# Patient Record
Sex: FEMALE | Race: WHITE | Employment: OTHER | URBAN - METROPOLITAN AREA
[De-identification: names, ages, dates, MRNs, and addresses within clinical notes are randomized per-mention and may not be internally consistent; named-entity substitution may affect disease eponyms.]

---

## 2017-03-02 ENCOUNTER — HOSPITAL ENCOUNTER (EMERGENCY)
Facility: HOSPITAL | Age: 42
Discharge: STILL ADMITTED OR ADMINISTRATIVE | End: 2017-03-03
Payer: COMMERCIAL

## 2017-03-02 ENCOUNTER — HOSPITAL ENCOUNTER (OUTPATIENT)
Age: 42
Discharge: LEFT BEFORE DISCHARGE | End: 2017-03-02
Attending: FAMILY MEDICINE
Payer: COMMERCIAL

## 2017-03-02 VITALS
TEMPERATURE: 99 F | DIASTOLIC BLOOD PRESSURE: 80 MMHG | BODY MASS INDEX: 26.81 KG/M2 | OXYGEN SATURATION: 96 % | HEART RATE: 77 BPM | SYSTOLIC BLOOD PRESSURE: 135 MMHG | WEIGHT: 181 LBS | RESPIRATION RATE: 15 BRPM | HEIGHT: 69 IN

## 2017-03-02 DIAGNOSIS — R07.9 ACUTE CHEST PAIN: Primary | ICD-10-CM

## 2017-03-02 LAB
ATRIAL RATE: 77 BPM
P AXIS: 39 DEGREES
P-R INTERVAL: 164 MS
Q-T INTERVAL: 392 MS
QRS DURATION: 96 MS
QTC CALCULATION (BEZET): 443 MS
R AXIS: 15 DEGREES
T AXIS: 36 DEGREES
VENTRICULAR RATE: 77 BPM

## 2017-03-02 PROCEDURE — 99205 OFFICE O/P NEW HI 60 MIN: CPT

## 2017-03-02 PROCEDURE — 93010 ELECTROCARDIOGRAM REPORT: CPT | Performed by: INTERNAL MEDICINE

## 2017-03-02 PROCEDURE — 93005 ELECTROCARDIOGRAM TRACING: CPT

## 2017-03-02 PROCEDURE — 93010 ELECTROCARDIOGRAM REPORT: CPT

## 2017-03-02 RX ORDER — ASPIRIN 81 MG/1
324 TABLET, CHEWABLE ORAL ONCE
Status: DISCONTINUED | OUTPATIENT
Start: 2017-03-02 | End: 2017-03-02

## 2017-03-02 NOTE — ED NOTES
Dr Yolanda Pride and myself discussed in length the importance of going to ED for further evaluation of abnormal EKG, patient acknowledged she understands risk of not going to ED via EMS and signed AMA.

## 2017-03-02 NOTE — ED PROVIDER NOTES
Patient Seen in: [facility]    History   Patient presents with:  Chest Pain Angina (cardiovascular)    Stated Complaint: chest pressure sob x 24 hours    HPI    Patient is a 19-year-old female.   Coming in today with complai Positive for chest pain. Negative for palpitations and leg swelling. Gastrointestinal: Negative. Genitourinary: Negative. Musculoskeletal: Negative. Skin: Negative. Neurological: Positive for dizziness.  Negative for tremors, seizures, syncope no wheezes. She has no rales. She exhibits no tenderness. Abdominal: Soft. Bowel sounds are normal. She exhibits no distension and no mass. There is no tenderness. There is no rebound and no guarding. Musculoskeletal: Normal range of motion.  She exhibi patient.

## 2017-03-02 NOTE — ED INITIAL ASSESSMENT (HPI)
Pt c/o chest pain and mild shortness of breath. Pt states yesterday that she took her children to the pool yesterday and noticed chest pain that started under her left breast and she noticed that she felt dizzy.   Then they traveled back from [facility] l

## 2017-03-02 NOTE — ED NOTES
Called patient to check if she was on her way to emergency dept. .  Patient stated she was on her way to see her fathers cardiologist.

## 2019-03-26 ENCOUNTER — TELEPHONE (OUTPATIENT)
Dept: PEDIATRICS CLINIC | Facility: CLINIC | Age: 44
End: 2019-03-26

## 2019-03-27 ENCOUNTER — TELEPHONE (OUTPATIENT)
Dept: OBGYN CLINIC | Facility: CLINIC | Age: 44
End: 2019-03-27

## 2019-03-29 ENCOUNTER — TELEPHONE (OUTPATIENT)
Dept: OBGYN CLINIC | Facility: CLINIC | Age: 44
End: 2019-03-29

## 2019-03-29 NOTE — TELEPHONE ENCOUNTER
Carltccarlos.     PN records received & reviewed by cnm's.  Per [facility], practice is unable to accept pt as a transfer of care this late in her pregnancy (NATALYA 4/6/19) with insufficient PN care

## 2019-03-29 NOTE — TELEPHONE ENCOUNTER
PER PT CALLING TO CONFIRMED THAT HER MEDICAL RECORDS HAS BEEN RECEIVED / PT STATE SHE ALSO FAXED OVER A INTRODUCTION LETTER / PLS ADV

## 2022-06-27 ENCOUNTER — OFFICE VISIT (OUTPATIENT)
Dept: CARDIOLOGY | Age: 47
End: 2022-06-27

## 2022-06-27 VITALS — BODY MASS INDEX: 29.49 KG/M2 | HEIGHT: 70 IN | WEIGHT: 206 LBS

## 2022-06-27 DIAGNOSIS — R07.2 PRECORDIAL CHEST PAIN: Primary | ICD-10-CM

## 2022-06-27 DIAGNOSIS — E78.00 PURE HYPERCHOLESTEROLEMIA: ICD-10-CM

## 2022-06-27 DIAGNOSIS — R94.31 ABNORMAL EKG: ICD-10-CM

## 2022-06-27 DIAGNOSIS — Z82.49 FAMILY HISTORY OF ISCHEMIC HEART DISEASE: ICD-10-CM

## 2022-06-27 PROCEDURE — 99204 OFFICE O/P NEW MOD 45 MIN: CPT | Performed by: INTERNAL MEDICINE

## 2022-06-27 PROCEDURE — 93000 ELECTROCARDIOGRAM COMPLETE: CPT | Performed by: INTERNAL MEDICINE

## 2022-06-27 SDOH — HEALTH STABILITY: PHYSICAL HEALTH: ON AVERAGE, HOW MANY MINUTES DO YOU ENGAGE IN EXERCISE AT THIS LEVEL?: 60 MIN

## 2022-06-27 SDOH — HEALTH STABILITY: PHYSICAL HEALTH: ON AVERAGE, HOW MANY DAYS PER WEEK DO YOU ENGAGE IN MODERATE TO STRENUOUS EXERCISE (LIKE A BRISK WALK)?: 6 DAYS

## (undated) NOTE — LETTER
Date & Time: 3/2/2017, 2:08 PM  Patient: Romel Seymour Rojelio  Attending Provider: Shikha Flores MD      This certifies that I, Saranya Garcia, a patient at an [facility], am leaving the facility v

## (undated) NOTE — ED AVS SNAPSHOT
[facility] SHANIKA Tracy    [address] [place] [address]    Phone:  [phone]    Fax:  [phone]           Zulema Santana   MRN: [id number]    Department:  THE [facility] Click [email or web address]      Or call [phone]    If you have any problems with your follow-up, please call our  at [phone].     Si usted tiene algun problema con andre sequimiento, por favor llame a nuestro adminstrador de casos al (6 Pharmacy Address Phone Number   [address] [facility] ([address] [facility]) [address] [facility] [address] [facility] [address] ([address]) [address] [facility] [address] [facility] [address]  ([address] [facility] [address] [address]) [phone] Now link in the [employer]U [employer]. Enter your [employer] Activation Code exactly as it appears below along with your Zip Code and Date of Birth to complete the sign-up process. If you do not sign up before the expiration date, you must request a new code.     [place]